# Patient Record
(demographics unavailable — no encounter records)

---

## 2018-03-05 NOTE — CT
PRELIMINARY REPORT/[facility]/EMERGENCY AFTER

HOURS PROCEDURE:

 

EXAM:

CT Abdomen and Pelvis Without Intravenous Contrast

 

CLINICAL HISTORY:

28 years old, male; Pain; Abdominal pain; Flank; Left; Patient HX: Pt has HX of cysturia and has freq
uent renal stones. Last night pt developed dull l flank pain that has become sharp and constant tonig
ht. Pt also C/O infrequent urination. No hematuria or fever.

 

TECHNIQUE:

Axial computed tomography images of the abdomen and pelvis without intravenous contrast.

Coronal reformatted images were created and reviewed.

 

COMPARISON:

No relevant prior studies available.

 

FINDINGS:

Lower thorax: No acute findings.

 

ABDOMEN:

Liver: No mass.

Gallbladder and bile ducts: No calcified stones. No ductal dilation.

Pancreas: No ductal dilation. No mass.

Spleen: No mass.

Adrenals: No mass.

Kidneys and ureters: Mild left hydronephrosis. No calcified stones. No hydroureter. Unremarkable

right kidney and ureter.

Stomach and bowel: Underdistended descending and sigmoid colon versus wall thickening/colitis.

Otherwise fecal loading. Diverticulosis. No evidence of bowel obstruction.

Appendix: No findings to suggest acute appendicitis.

 

PELVIS:

Bladder: No stones.

Reproductive: No acute findings.

 

ABDOMEN and PELVIS:

Intraperitoneal space: No acute findings. No free air. No significant fluid collection.

Bones/joints: No acute fracture.

Soft tissues: No acute findings.

Vasculature: No acute findings. No abdominal aortic aneurysm.

Lymph nodes: Small and prominent mesenteric lymph nodes.

 

IMPRESSION:

Mild left hydronephrosis. No calcified stones.

Underdistended descending and sigmoid colon versus wall thickening/colitis.

 

Thank you for allowing us to participate in the care of your patient.

 

Dictated and Authenticated by: Vinny Jeronimo MD

03/05/2018 3:36 AM Central Time ([place] & [place])

 

 

FINAL REPORT 

 

ABDOMEN CT WITHOUT CONTRAST

PELVIC CT WITHOUT CONTRAST:

 

HISTORY: 

Cysturia.  Frequent renal calculi.

 

COMPARISON: 

None.

 

TECHNIQUE: 

Abdomen and pelvic CT are performed without contrast, utilizing renal stone protocol.  Coronal reform
atted images are submitted for interpretation.

 

FINDINGS/IMPRESSION: 

This report is in agreement with the preliminary report by [facility].  There is mild left-sided hydronephro
sis without associated obstructing calculus.  Nonemergent IVP can be performed.  Normal caliber appen
blake.

 

POS: [facility]